# Patient Record
Sex: MALE | Race: WHITE | NOT HISPANIC OR LATINO | URBAN - NONMETROPOLITAN AREA
[De-identification: names, ages, dates, MRNs, and addresses within clinical notes are randomized per-mention and may not be internally consistent; named-entity substitution may affect disease eponyms.]

---

## 2021-12-08 ENCOUNTER — IMPORTED ENCOUNTER (OUTPATIENT)
Dept: URBAN - NONMETROPOLITAN AREA CLINIC 1 | Facility: CLINIC | Age: 51
End: 2021-12-08

## 2021-12-08 PROCEDURE — 92004 COMPRE OPH EXAM NEW PT 1/>: CPT

## 2021-12-08 PROCEDURE — 92015 DETERMINE REFRACTIVE STATE: CPT

## 2021-12-08 NOTE — PATIENT DISCUSSION
Simple Hyperopia OS w/Presbyopia-  Discussed findings w/patient-  no spectacle Rx required-  ok to continue with the OTC +1.50/+1.75 at this time. -  continue to monitor -  RTC 1 year OR prn; 's Notes:  12/8/2021DFE 12/8/2021

## 2022-01-06 PROBLEM — H52.02: Noted: 2022-01-06

## 2022-01-06 PROBLEM — H52.4: Noted: 2022-01-06

## 2022-04-09 ASSESSMENT — VISUAL ACUITY
OD_CC: 20/20
OU_CC: 20/20
OU_CC: 20/20
OS_CC: 20/20

## 2022-04-09 ASSESSMENT — TONOMETRY
OS_IOP_MMHG: 15
OD_IOP_MMHG: 15